# Patient Record
Sex: MALE | Race: BLACK OR AFRICAN AMERICAN | NOT HISPANIC OR LATINO | URBAN - METROPOLITAN AREA
[De-identification: names, ages, dates, MRNs, and addresses within clinical notes are randomized per-mention and may not be internally consistent; named-entity substitution may affect disease eponyms.]

---

## 2024-01-16 ENCOUNTER — EMERGENCY (EMERGENCY)
Facility: HOSPITAL | Age: 74
LOS: 1 days | Discharge: HOME | End: 2024-01-16
Attending: EMERGENCY MEDICINE
Payer: COMMERCIAL

## 2024-01-16 VITALS
RESPIRATION RATE: 18 BRPM | WEIGHT: 164.91 LBS | HEART RATE: 84 BPM | SYSTOLIC BLOOD PRESSURE: 129 MMHG | TEMPERATURE: 98 F | OXYGEN SATURATION: 95 % | DIASTOLIC BLOOD PRESSURE: 53 MMHG

## 2024-01-16 PROCEDURE — 99285 EMERGENCY DEPT VISIT HI MDM: CPT

## 2024-01-16 PROCEDURE — 99283 EMERGENCY DEPT VISIT LOW MDM: CPT

## 2024-01-16 NOTE — ED ADULT NURSE NOTE - OBJECTIVE STATEMENT
Pt reports he sniffed some heroin prior to arrival, pt awake and alert and answers all questions appropriately.

## 2024-01-16 NOTE — ED PROVIDER NOTE - OBJECTIVE STATEMENT
73 yr old male from [facility] with hx of HTN, HIV undetectable, heroin user on methadone 20mg and BPH presents to ed c/o overdose on heroin. pt admits not using for awhile and today used it around 5-6p and passed out while in bed. denies any alcohol use. no fever, no pain. asx. no narcan given.

## 2024-01-16 NOTE — ED PROVIDER NOTE - ENMT, MLM
Airway patent, Nasal mucosa clear. Mouth with dry mucosa. Throat has no vesicles, no oropharyngeal exudates and uvula is midline. no tongue lac

## 2024-01-16 NOTE — ED PROVIDER NOTE - CLINICAL SUMMARY MEDICAL DECISION MAKING FREE TEXT BOX
73 yr old male from [facility] with hx of HTN, HIV undetectable, heroin user on methadone 20mg and BPH presents to ed c/o overdose on heroin. pt admits not using for awhile and today used it around 5-6p and passed out while in bed. denies any alcohol use. no fever, no pain. asx. no narcan given.    heroin od. no narcan given pt asx. no sign of seziure or cva. dc

## 2024-01-16 NOTE — ED ADULT NURSE NOTE - NSFALLUNIVINTERV_ED_ALL_ED
Bed/Stretcher in lowest position, wheels locked, appropriate side rails in place/Call bell, personal items and telephone in reach/Instruct patient to call for assistance before getting out of bed/chair/stretcher/Non-slip footwear applied when patient is off stretcher/[place] to call system/Physically safe environment - no spills, clutter or unnecessary equipment/Purposeful proactive rounding/Room/bathroom lighting operational, light cord in reach

## 2024-01-16 NOTE — ED PROVIDER NOTE - PATIENT PORTAL LINK FT
You can access the FollowMyHealth Patient Portal offered by [facility] by registering at the following website: [email or web address]. By joining [employer]s FollowMyHealth portal, you will also be able to view your health information using other applications (apps) compatible with our system.

## 2024-01-17 VITALS
TEMPERATURE: 98 F | OXYGEN SATURATION: 95 % | RESPIRATION RATE: 18 BRPM | SYSTOLIC BLOOD PRESSURE: 122 MMHG | DIASTOLIC BLOOD PRESSURE: 75 MMHG | HEART RATE: 72 BPM

## 2024-01-17 NOTE — SBIRT NOTE ADULT - NSSBIRTDRGBRIEFINTDET_GEN_A_CORE
he admitted to heroin use after so many years of abstinence from heroin. Pt claimed not to have any problem with his heroin use, because he is going to continue to abstain from it.  Emotional support and psychoeducation re heroin provided.

## 2024-01-17 NOTE — CHART NOTE - NSCHARTNOTEFT_GEN_A_CORE
Consult: Positive sbirt    Pt is a 73  yr old male who was brought to the ED by EMS from [facility] with complaint of heroin overdose.   Pt screened positive for sbirt and he participated  in screening. Pt seen at bedside, alert and oriented. Tosha introduced self and supportive role explained.  Pt is on 20mg of methadone and he admitted to heroin use after so many years of abstinence from heroin. He denied alcohol use. Pt claimed not to have any problem with his heroin use, because he is going to continue to abstain from it.  Emotional support and psychoeducation re heroin provided.  Pt voiced understanding. Pt was socially cleared and medical team updated.